# Patient Record
Sex: MALE | Race: WHITE | NOT HISPANIC OR LATINO
[De-identification: names, ages, dates, MRNs, and addresses within clinical notes are randomized per-mention and may not be internally consistent; named-entity substitution may affect disease eponyms.]

---

## 2018-12-05 PROBLEM — Z00.00 ENCOUNTER FOR PREVENTIVE HEALTH EXAMINATION: Status: ACTIVE | Noted: 2018-12-05

## 2018-12-10 ENCOUNTER — RESULT REVIEW (OUTPATIENT)
Age: 33
End: 2018-12-10

## 2018-12-14 ENCOUNTER — APPOINTMENT (OUTPATIENT)
Dept: HEMATOLOGY ONCOLOGY | Facility: CLINIC | Age: 33
End: 2018-12-14
Payer: COMMERCIAL

## 2018-12-14 ENCOUNTER — RESULT REVIEW (OUTPATIENT)
Age: 33
End: 2018-12-14

## 2018-12-14 VITALS
DIASTOLIC BLOOD PRESSURE: 78 MMHG | BODY MASS INDEX: 26.68 KG/M2 | OXYGEN SATURATION: 100 % | WEIGHT: 197 LBS | HEIGHT: 72.05 IN | RESPIRATION RATE: 20 BRPM | HEART RATE: 87 BPM | TEMPERATURE: 98.5 F | SYSTOLIC BLOOD PRESSURE: 114 MMHG

## 2018-12-14 DIAGNOSIS — Z80.6 FAMILY HISTORY OF LEUKEMIA: ICD-10-CM

## 2018-12-14 DIAGNOSIS — F17.290 NICOTINE DEPENDENCE, OTHER TOBACCO PRODUCT, UNCOMPLICATED: ICD-10-CM

## 2018-12-14 DIAGNOSIS — R16.1 SPLENOMEGALY, NOT ELSEWHERE CLASSIFIED: ICD-10-CM

## 2018-12-14 DIAGNOSIS — Z86.69 PERSONAL HISTORY OF OTHER DISEASES OF THE NERVOUS SYSTEM AND SENSE ORGANS: ICD-10-CM

## 2018-12-14 DIAGNOSIS — Z78.9 OTHER SPECIFIED HEALTH STATUS: ICD-10-CM

## 2018-12-14 DIAGNOSIS — Z87.448 PERSONAL HISTORY OF OTHER DISEASES OF URINARY SYSTEM: ICD-10-CM

## 2018-12-14 PROCEDURE — 99245 OFF/OP CONSLTJ NEW/EST HI 55: CPT

## 2018-12-14 RX ORDER — COLD-HOT PACK
EACH MISCELLANEOUS
Refills: 0 | Status: ACTIVE | COMMUNITY

## 2018-12-14 RX ORDER — CHOLECALCIFEROL (VITAMIN D3) 25 MCG
TABLET ORAL
Refills: 0 | Status: ACTIVE | COMMUNITY

## 2018-12-14 NOTE — PHYSICAL EXAM
[Fully active, able to carry on all pre-disease performance without restriction] : Status 0 - Fully active, able to carry on all pre-disease performance without restriction

## 2018-12-14 NOTE — CONSULT LETTER
[Dear  ___] : Dear  [unfilled], [Consult Letter:] : I had the pleasure of evaluating your patient, [unfilled]. [Please see my note below.] : Please see my note below. [Consult Closing:] : Thank you very much for allowing me to participate in the care of this patient.  If you have any questions, please do not hesitate to contact me. [Sincerely,] : Sincerely, [FreeTextEntry3] : Bennett Banks MD, MPH\par Attending Physician\par Hematology Oncology\par [facility] [place]\par [facility]\par

## 2018-12-14 NOTE — ASSESSMENT
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 No labs on follow up\par He plans to move to [place] in near future\par

## 2018-12-14 NOTE — HISTORY OF PRESENT ILLNESS
[de-identified] : 33 year old male presents today for an evaluation of an elevated H/H, referred by Dr. Mario Julien.  Labs dated 11/12/2018 show a Hgb of 18.3, Hct 52.0.   Patient states that in 2014 he had splenomegaly and was suggested to see a hematologist, however he did not follow up.  Patient has fatigue, however he contributes that to his migraines and sleeping habits.  Patients father and maternal grandmother have CLL and are doing well on treatment.  \par \par He reports having a big spleen in 2014- had some lUQ discomfort\par Found on ultrasound abdomen (3/18) - spleen is enlarged measuring 13.8 cm.\par \par He reports any time his migraine acts up- his Hgb counts go up\par Has ho migraine \par \par

## 2018-12-28 ENCOUNTER — TRANSCRIPTION ENCOUNTER (OUTPATIENT)
Age: 33
End: 2018-12-28

## 2019-01-04 ENCOUNTER — APPOINTMENT (OUTPATIENT)
Dept: HEMATOLOGY ONCOLOGY | Facility: CLINIC | Age: 34
End: 2019-01-04
Payer: COMMERCIAL

## 2019-01-04 VITALS
TEMPERATURE: 98.2 F | RESPIRATION RATE: 16 BRPM | BODY MASS INDEX: 26.41 KG/M2 | DIASTOLIC BLOOD PRESSURE: 78 MMHG | HEIGHT: 72.05 IN | SYSTOLIC BLOOD PRESSURE: 121 MMHG | HEART RATE: 69 BPM | WEIGHT: 195 LBS | OXYGEN SATURATION: 98 %

## 2019-01-04 DIAGNOSIS — D58.2 OTHER HEMOGLOBINOPATHIES: ICD-10-CM

## 2019-01-04 PROCEDURE — 99214 OFFICE O/P EST MOD 30 MIN: CPT

## 2019-01-04 NOTE — HISTORY OF PRESENT ILLNESS
[de-identified] : 33 year old male presents today for an evaluation of an elevated H/H, referred by Dr. Mario Julien.  Labs dated 11/12/2018 show a Hgb of 18.3, Hct 52.0.   Patient states that in 2014 he had splenomegaly and was suggested to see a hematologist, however he did not follow up.  Patient has fatigue, however he contributes that to his migraines and sleeping habits.  Patients father and maternal grandmother have CLL and are doing well on treatment.  \par \par He reports having a big spleen in 2014- had some lUQ discomfort\par Found on ultrasound abdomen (3/18) - spleen is enlarged measuring 13.8 cm.\par \par He reports any time his migraine acts up- his Hgb counts go up\par Has ho migraine \par \par  [de-identified] : He is seen today for follow up\par \par No new complaints\par

## 2019-01-04 NOTE — CONSULT LETTER
[Dear  ___] : Dear  [unfilled], [Please see my note below.] : Please see my note below. [Consult Closing:] : Thank you very much for allowing me to participate in the care of this patient.  If you have any questions, please do not hesitate to contact me. [Sincerely,] : Sincerely, [Courtesy Letter:] : I had the pleasure of seeing your patient, [unfilled], in my office today. [FreeTextEntry3] : Bennett Banks MD, MPH\par Attending Physician\par Hematology Oncology\par [facility] [place]\par [facility]\par

## 2019-01-04 NOTE — RESULTS/DATA
[FreeTextEntry1] : Hgb 16.9\par Hct 48.6\par \par Erythropoietin 5.9\par \par JAK2 V617 and exon 12-15 - negative\par

## 2019-01-04 NOTE — ASSESSMENT
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 CBC, CMP, erythropoietin\par

## 2019-03-04 ENCOUNTER — TRANSCRIPTION ENCOUNTER (OUTPATIENT)
Age: 34
End: 2019-03-04

## 2019-03-08 ENCOUNTER — APPOINTMENT (OUTPATIENT)
Dept: HEMATOLOGY ONCOLOGY | Facility: CLINIC | Age: 34
End: 2019-03-08

## 2019-04-04 ENCOUNTER — RESULT REVIEW (OUTPATIENT)
Age: 34
End: 2019-04-04